# Patient Record
Sex: MALE | Race: WHITE | Employment: OTHER
[De-identification: names, ages, dates, MRNs, and addresses within clinical notes are randomized per-mention and may not be internally consistent; named-entity substitution may affect disease eponyms.]

---

## 2017-03-02 ENCOUNTER — OFFICE VISIT (OUTPATIENT)
Dept: INTERNAL MEDICINE | Facility: CLINIC | Age: 62
End: 2017-03-02

## 2017-03-02 VITALS
DIASTOLIC BLOOD PRESSURE: 78 MMHG | BODY MASS INDEX: 30.91 KG/M2 | WEIGHT: 220.8 LBS | HEART RATE: 64 BPM | HEIGHT: 71 IN | SYSTOLIC BLOOD PRESSURE: 168 MMHG | RESPIRATION RATE: 16 BRPM

## 2017-03-02 DIAGNOSIS — L50.9 HIVES: Primary | ICD-10-CM

## 2017-03-02 PROCEDURE — 99213 OFFICE O/P EST LOW 20 MIN: CPT | Performed by: INTERNAL MEDICINE

## 2017-03-02 RX ORDER — OMEPRAZOLE 20 MG/1
20 CAPSULE, DELAYED RELEASE ORAL DAILY
COMMUNITY

## 2017-03-02 ASSESSMENT — ENCOUNTER SYMPTOMS
URTICARIA: 1
WHEEZING: 0
COUGH: 0
EYE PAIN: 0
SHORTNESS OF BREATH: 0
VOMITING: 0

## 2017-03-02 ASSESSMENT — PATIENT HEALTH QUESTIONNAIRE - PHQ9
SUM OF ALL RESPONSES TO PHQ QUESTIONS 1-9: 0
SUM OF ALL RESPONSES TO PHQ9 QUESTIONS 1 & 2: 0
2. FEELING DOWN, DEPRESSED OR HOPELESS: 0
1. LITTLE INTEREST OR PLEASURE IN DOING THINGS: 0

## 2017-04-18 ENCOUNTER — HOSPITAL ENCOUNTER (OUTPATIENT)
Age: 62
Discharge: HOME | End: 2017-04-18
Payer: COMMERCIAL

## 2017-04-18 LAB
ABSOLUTE EOS #: 0 K/UL (ref 0–0.4)
ABSOLUTE LYMPH #: 2.2 K/UL (ref 1–4.8)
ABSOLUTE MONO #: 0.7 K/UL (ref 0.1–1.2)
BASOPHILS # BLD: 0 % (ref 0–2)
BASOPHILS ABSOLUTE: 0 K/UL (ref 0–0.2)
C-REACTIVE PROTEIN: 1 MG/L (ref 0–5)
DIFFERENTIAL TYPE: ABNORMAL
EOSINOPHILS RELATIVE PERCENT: 0 % (ref 1–4)
LYMPHOCYTES # BLD: 37 % (ref 24–44)
MONOCYTES # BLD: 12 % (ref 2–11)
PLATELET ESTIMATE: ABNORMAL
RBC # BLD: ABNORMAL 10*6/UL
SEDIMENTATION RATE, ERYTHROCYTE: 1 MM (ref 0–10)
SEG NEUTROPHILS: 51 % (ref 36–66)
SEGMENTED NEUTROPHILS ABSOLUTE COUNT: 3 K/UL (ref 1.8–7.7)
WBC # BLD: 5.9 K/UL (ref 3.5–11)
WBC # BLD: ABNORMAL 10*3/UL

## 2017-04-18 PROCEDURE — 85651 RBC SED RATE NONAUTOMATED: CPT

## 2017-04-18 PROCEDURE — 84165 PROTEIN E-PHORESIS SERUM: CPT

## 2017-04-18 PROCEDURE — 86038 ANTINUCLEAR ANTIBODIES: CPT

## 2017-04-18 PROCEDURE — 85048 AUTOMATED LEUKOCYTE COUNT: CPT

## 2017-04-18 PROCEDURE — 84155 ASSAY OF PROTEIN SERUM: CPT

## 2017-04-18 PROCEDURE — 36415 COLL VENOUS BLD VENIPUNCTURE: CPT

## 2017-04-18 PROCEDURE — 86140 C-REACTIVE PROTEIN: CPT

## 2017-04-19 LAB — ANTI-NUCLEAR ANTIBODY (ANA): NEGATIVE

## 2017-04-20 LAB
ALBUMIN (CALCULATED): 4.8 G/DL (ref 3.2–5.2)
ALBUMIN PERCENT: 71 % (ref 45–65)
ALPHA 1 PERCENT: 2 % (ref 3–6)
ALPHA 2 PERCENT: 7 % (ref 6–13)
ALPHA-1-GLOBULIN: 0.1 G/DL (ref 0.1–0.4)
ALPHA-2-GLOBULIN: 0.5 G/DL (ref 0.5–0.9)
BETA GLOBULIN: 0.5 G/DL (ref 0.5–1.1)
BETA PERCENT: 8 % (ref 11–19)
GAMMA GLOBULIN %: 12 % (ref 9–20)
GAMMA GLOBULIN: 0.8 G/DL (ref 0.5–1.5)
PATHOLOGIST: ABNORMAL
PROTEIN ELECTROPHORESIS, SERUM: ABNORMAL
TOTAL PROT. SUM,%: 100 % (ref 98–102)
TOTAL PROT. SUM: 6.7 G/DL (ref 6.3–8.2)
TOTAL PROTEIN: 6.7 G/DL (ref 6.4–8.3)

## 2017-09-07 ENCOUNTER — OFFICE VISIT (OUTPATIENT)
Dept: PRIMARY CARE CLINIC | Age: 62
End: 2017-09-07
Payer: COMMERCIAL

## 2017-09-07 VITALS
BODY MASS INDEX: 30.52 KG/M2 | SYSTOLIC BLOOD PRESSURE: 140 MMHG | WEIGHT: 218 LBS | HEIGHT: 71 IN | OXYGEN SATURATION: 99 % | DIASTOLIC BLOOD PRESSURE: 80 MMHG | HEART RATE: 58 BPM

## 2017-09-07 DIAGNOSIS — Z12.11 SCREEN FOR COLON CANCER: ICD-10-CM

## 2017-09-07 DIAGNOSIS — R03.0 ELEVATED BP WITHOUT DIAGNOSIS OF HYPERTENSION: ICD-10-CM

## 2017-09-07 DIAGNOSIS — Z00.00 ENCOUNTER FOR GENERAL ADULT MEDICAL EXAMINATION W/O ABNORMAL FINDINGS: Primary | ICD-10-CM

## 2017-09-07 PROBLEM — I10 ESSENTIAL HYPERTENSION: Status: ACTIVE | Noted: 2017-09-07

## 2017-09-07 PROBLEM — I10 ESSENTIAL HYPERTENSION: Status: RESOLVED | Noted: 2017-09-07 | Resolved: 2017-09-07

## 2017-09-07 PROCEDURE — 99396 PREV VISIT EST AGE 40-64: CPT | Performed by: NURSE PRACTITIONER

## 2017-09-07 RX ORDER — CETIRIZINE HYDROCHLORIDE 5 MG/1
5 TABLET ORAL DAILY
COMMUNITY

## 2017-09-07 ASSESSMENT — ENCOUNTER SYMPTOMS
SHORTNESS OF BREATH: 0
COUGH: 0
BACK PAIN: 0
ABDOMINAL PAIN: 0

## 2017-09-21 ENCOUNTER — TELEPHONE (OUTPATIENT)
Dept: PRIMARY CARE CLINIC | Age: 62
End: 2017-09-21

## 2017-09-21 ENCOUNTER — HOSPITAL ENCOUNTER (OUTPATIENT)
Age: 62
Discharge: HOME | End: 2017-09-21
Payer: COMMERCIAL

## 2017-09-21 DIAGNOSIS — Z00.00 ENCOUNTER FOR GENERAL ADULT MEDICAL EXAMINATION W/O ABNORMAL FINDINGS: ICD-10-CM

## 2017-09-21 LAB
ALBUMIN SERPL-MCNC: 4.2 G/DL (ref 3.5–5.2)
ALBUMIN/GLOBULIN RATIO: 1.6 (ref 1–2.5)
ALP BLD-CCNC: 54 U/L (ref 40–129)
ALT SERPL-CCNC: 19 U/L (ref 5–41)
ANION GAP SERPL CALCULATED.3IONS-SCNC: 15 MMOL/L (ref 9–17)
AST SERPL-CCNC: 21 U/L
BILIRUB SERPL-MCNC: 0.89 MG/DL (ref 0.3–1.2)
BUN BLDV-MCNC: 26 MG/DL (ref 8–23)
BUN/CREAT BLD: ABNORMAL (ref 9–20)
CALCIUM SERPL-MCNC: 8.9 MG/DL (ref 8.6–10.4)
CHLORIDE BLD-SCNC: 102 MMOL/L (ref 98–107)
CHOLESTEROL/HDL RATIO: 2.8
CHOLESTEROL: 159 MG/DL
CO2: 26 MMOL/L (ref 20–31)
CREAT SERPL-MCNC: 0.97 MG/DL (ref 0.7–1.2)
GFR AFRICAN AMERICAN: >60 ML/MIN
GFR NON-AFRICAN AMERICAN: >60 ML/MIN
GFR SERPL CREATININE-BSD FRML MDRD: ABNORMAL ML/MIN/{1.73_M2}
GFR SERPL CREATININE-BSD FRML MDRD: ABNORMAL ML/MIN/{1.73_M2}
GLUCOSE BLD-MCNC: 137 MG/DL (ref 70–99)
HCT VFR BLD CALC: 48.5 % (ref 41–53)
HDLC SERPL-MCNC: 57 MG/DL
HEMOGLOBIN: 16.1 G/DL (ref 13.5–17.5)
LDL CHOLESTEROL: 87 MG/DL (ref 0–130)
MCH RBC QN AUTO: 30.8 PG (ref 26–34)
MCHC RBC AUTO-ENTMCNC: 33.3 G/DL (ref 31–37)
MCV RBC AUTO: 92.7 FL (ref 80–100)
PDW BLD-RTO: 14.9 % (ref 12.5–15.4)
PLATELET # BLD: 247 K/UL (ref 140–450)
PMV BLD AUTO: 8.9 FL (ref 6–12)
POTASSIUM SERPL-SCNC: 3.9 MMOL/L (ref 3.7–5.3)
PROSTATE SPECIFIC ANTIGEN: 0.54 UG/L
RBC # BLD: 5.23 M/UL (ref 4.5–5.9)
SODIUM BLD-SCNC: 143 MMOL/L (ref 135–144)
TOTAL PROTEIN: 6.8 G/DL (ref 6.4–8.3)
TRIGL SERPL-MCNC: 77 MG/DL
VLDLC SERPL CALC-MCNC: NORMAL MG/DL (ref 1–30)
WBC # BLD: 5.8 K/UL (ref 3.5–11)

## 2017-09-21 PROCEDURE — 85027 COMPLETE CBC AUTOMATED: CPT

## 2017-09-21 PROCEDURE — 80053 COMPREHEN METABOLIC PANEL: CPT

## 2017-09-21 PROCEDURE — G0103 PSA SCREENING: HCPCS

## 2017-09-21 PROCEDURE — 36415 COLL VENOUS BLD VENIPUNCTURE: CPT

## 2017-09-21 PROCEDURE — 80061 LIPID PANEL: CPT

## 2017-10-06 ENCOUNTER — ANESTHESIA EVENT (OUTPATIENT)
Dept: OPERATING ROOM | Facility: CLINIC | Age: 62
End: 2017-10-06
Payer: COMMERCIAL

## 2017-10-06 ENCOUNTER — HOSPITAL ENCOUNTER (OUTPATIENT)
Facility: CLINIC | Age: 62
Setting detail: OUTPATIENT SURGERY
Discharge: HOME | End: 2017-10-06
Attending: SURGERY | Admitting: SURGERY
Payer: COMMERCIAL

## 2017-10-06 ENCOUNTER — ANESTHESIA (OUTPATIENT)
Dept: OPERATING ROOM | Facility: CLINIC | Age: 62
End: 2017-10-06
Payer: COMMERCIAL

## 2017-10-06 VITALS
DIASTOLIC BLOOD PRESSURE: 84 MMHG | RESPIRATION RATE: 16 BRPM | HEART RATE: 64 BPM | BODY MASS INDEX: 30.1 KG/M2 | TEMPERATURE: 97.2 F | OXYGEN SATURATION: 94 % | WEIGHT: 215 LBS | SYSTOLIC BLOOD PRESSURE: 137 MMHG | HEIGHT: 71 IN

## 2017-10-06 VITALS
SYSTOLIC BLOOD PRESSURE: 126 MMHG | RESPIRATION RATE: 13 BRPM | DIASTOLIC BLOOD PRESSURE: 80 MMHG | OXYGEN SATURATION: 99 %

## 2017-10-06 PROBLEM — Z12.11 ENCOUNTER FOR SCREENING COLONOSCOPY: Status: ACTIVE | Noted: 2017-10-06

## 2017-10-06 PROCEDURE — 2500000003 HC RX 250 WO HCPCS: Performed by: NURSE ANESTHETIST, CERTIFIED REGISTERED

## 2017-10-06 PROCEDURE — 3609027000 HC COLONOSCOPY: Performed by: SURGERY

## 2017-10-06 PROCEDURE — 7100000011 HC PHASE II RECOVERY - ADDTL 15 MIN: Performed by: SURGERY

## 2017-10-06 PROCEDURE — 2580000003 HC RX 258: Performed by: ANESTHESIOLOGY

## 2017-10-06 PROCEDURE — 7100000000 HC PACU RECOVERY - FIRST 15 MIN: Performed by: SURGERY

## 2017-10-06 PROCEDURE — 6360000002 HC RX W HCPCS: Performed by: ANESTHESIOLOGY

## 2017-10-06 PROCEDURE — 7100000010 HC PHASE II RECOVERY - FIRST 15 MIN: Performed by: SURGERY

## 2017-10-06 PROCEDURE — 3700000000 HC ANESTHESIA ATTENDED CARE: Performed by: SURGERY

## 2017-10-06 PROCEDURE — 6360000002 HC RX W HCPCS: Performed by: NURSE ANESTHETIST, CERTIFIED REGISTERED

## 2017-10-06 RX ORDER — DIPHENHYDRAMINE HYDROCHLORIDE 50 MG/ML
12.5 INJECTION INTRAMUSCULAR; INTRAVENOUS
Status: DISCONTINUED | OUTPATIENT
Start: 2017-10-06 | End: 2017-10-06 | Stop reason: HOSPADM

## 2017-10-06 RX ORDER — LABETALOL HYDROCHLORIDE 5 MG/ML
5 INJECTION, SOLUTION INTRAVENOUS EVERY 10 MIN PRN
Status: DISCONTINUED | OUTPATIENT
Start: 2017-10-06 | End: 2017-10-06 | Stop reason: HOSPADM

## 2017-10-06 RX ORDER — OXYCODONE HYDROCHLORIDE AND ACETAMINOPHEN 5; 325 MG/1; MG/1
1 TABLET ORAL PRN
Status: DISCONTINUED | OUTPATIENT
Start: 2017-10-06 | End: 2017-10-06 | Stop reason: HOSPADM

## 2017-10-06 RX ORDER — FENTANYL CITRATE 50 UG/ML
25 INJECTION, SOLUTION INTRAMUSCULAR; INTRAVENOUS EVERY 5 MIN PRN
Status: DISCONTINUED | OUTPATIENT
Start: 2017-10-06 | End: 2017-10-06 | Stop reason: HOSPADM

## 2017-10-06 RX ORDER — SODIUM CHLORIDE, SODIUM LACTATE, POTASSIUM CHLORIDE, CALCIUM CHLORIDE 600; 310; 30; 20 MG/100ML; MG/100ML; MG/100ML; MG/100ML
INJECTION, SOLUTION INTRAVENOUS CONTINUOUS
Status: DISCONTINUED | OUTPATIENT
Start: 2017-10-06 | End: 2017-10-06 | Stop reason: HOSPADM

## 2017-10-06 RX ORDER — MIDAZOLAM HYDROCHLORIDE 1 MG/ML
1 INJECTION INTRAMUSCULAR; INTRAVENOUS ONCE
Status: COMPLETED | OUTPATIENT
Start: 2017-10-06 | End: 2017-10-06

## 2017-10-06 RX ORDER — PROPOFOL 10 MG/ML
INJECTION, EMULSION INTRAVENOUS PRN
Status: DISCONTINUED | OUTPATIENT
Start: 2017-10-06 | End: 2017-10-06 | Stop reason: SDUPTHER

## 2017-10-06 RX ORDER — LIDOCAINE HYDROCHLORIDE 10 MG/ML
INJECTION, SOLUTION INFILTRATION; PERINEURAL PRN
Status: DISCONTINUED | OUTPATIENT
Start: 2017-10-06 | End: 2017-10-06 | Stop reason: SDUPTHER

## 2017-10-06 RX ORDER — ONDANSETRON 2 MG/ML
4 INJECTION INTRAMUSCULAR; INTRAVENOUS
Status: DISCONTINUED | OUTPATIENT
Start: 2017-10-06 | End: 2017-10-06 | Stop reason: HOSPADM

## 2017-10-06 RX ORDER — MORPHINE SULFATE 2 MG/ML
2 INJECTION, SOLUTION INTRAMUSCULAR; INTRAVENOUS EVERY 5 MIN PRN
Status: DISCONTINUED | OUTPATIENT
Start: 2017-10-06 | End: 2017-10-06 | Stop reason: HOSPADM

## 2017-10-06 RX ORDER — OXYCODONE HYDROCHLORIDE AND ACETAMINOPHEN 5; 325 MG/1; MG/1
2 TABLET ORAL PRN
Status: DISCONTINUED | OUTPATIENT
Start: 2017-10-06 | End: 2017-10-06 | Stop reason: HOSPADM

## 2017-10-06 RX ADMIN — MIDAZOLAM HYDROCHLORIDE 1 MG: 1 INJECTION, SOLUTION INTRAMUSCULAR; INTRAVENOUS at 10:01

## 2017-10-06 RX ADMIN — LIDOCAINE HYDROCHLORIDE 25 MG: 10 INJECTION, SOLUTION INFILTRATION; PERINEURAL at 10:08

## 2017-10-06 RX ADMIN — PROPOFOL 20 MG: 10 INJECTION, EMULSION INTRAVENOUS at 10:10

## 2017-10-06 RX ADMIN — SODIUM CHLORIDE, POTASSIUM CHLORIDE, SODIUM LACTATE AND CALCIUM CHLORIDE: 600; 310; 30; 20 INJECTION, SOLUTION INTRAVENOUS at 10:02

## 2017-10-06 RX ADMIN — PROPOFOL 60 MG: 10 INJECTION, EMULSION INTRAVENOUS at 10:08

## 2017-10-06 ASSESSMENT — PAIN - FUNCTIONAL ASSESSMENT: PAIN_FUNCTIONAL_ASSESSMENT: 0-10

## 2017-10-06 NOTE — OP NOTE
6 Sandra Beyer    10/6/2017    Gwendolyn Gamboa  1955      Pre op Diag: screening    Procedure: colonoscopy     Postop Diag: normal exam    Anesthesia MAC    The patient was placed on his left side. A rectal exam was done that showed no masses. The scope was passed under direct vision through the rectum and sigmoid colon. There were diverticula in the sigmoid colon. The prep was good. The scope was then passed across the transverse colon, down the right colon to the cecum. The scope was then slowly withdrawn checking all the walls of the bowel a second time. There were no abnormalities found. The scope was flexed in the rectum. There were no significant hemorrhoids. The patient tolerated the procedure well. I recommend a repeat colonoscopy in 10 years. Escobar Dumont MD.    Copy to Jorge Luis Harris CNP

## 2017-10-06 NOTE — H&P
REASON FOR procedure:  screening    REQUESTING PHYSICIAN:  Brenda Cruz MD    HISTORY OF PRESENT ILLNESS:    The patient is a 62 y.o. male who presents for colonoscopy    MEDICAL HISTORY:   Past Medical History:   Diagnosis Date    Hypertension     Irritable bowel syndrome     Prostate finding        SURGICAL HISTORY:  No past surgical history on file. MEDS:  Prior to Admission medications    Medication Sig Start Date End Date Taking? Authorizing Provider   cetirizine (ZYRTEC) 5 MG tablet Take 5 mg by mouth daily    Historical Provider, MD   omeprazole (PRILOSEC) 20 MG delayed release capsule Take 20 mg by mouth daily    Historical Provider, MD   Misc Natural Products (GLUCOSAMINE CHOND COMPLEX/MSM) TABS Take 1 tablet by mouth daily    Historical Provider, MD   Multiple Vitamins-Minerals (THERAPEUTIC MULTIVITAMIN-MINERALS) tablet Take 1 tablet by mouth daily    Historical Provider, MD     PRN Meds:    ALLERGIES:  No Known Allergies    SOCIAL HISTORY:    reports that he has never smoked. He has never used smokeless tobacco. He reports that he drinks alcohol. He reports that he does not use illicit drugs. FAMILY HISTORY:   No family history on file. REVIEW OF SYSTEMS:    HENT: normal  Cardiac: normal  Resp: normal  GI: normal  Ortho: normal    PHYSICAL EXAM:    There were no vitals taken for this visit. General:  Alert and oriented x 3. No acute distress. Chest: Lungs clear to auscultation, with no rales, wheezes, or rhonchi. Unlabored breathing pattern. Cardiovascular: Heart sounds were normal with a regular rate and rhythm. There were no murmurs or gallops, PMI nondisplaced. Abdomen: Bowel sounds were normal.  The abdomen was soft and non distended. There was no tenderness, guarding, rebound, or rigidity. There was no masses, hepatosplenomegaly, or hernias. No peritoneal signs. Skin/Musculoskeltal/Ext:  No jaundice. No clubbing, cyanosis or edema. ROM intact.   No

## 2017-10-06 NOTE — ANESTHESIA POSTPROCEDURE EVALUATION
Department of Anesthesiology  Postprocedure Note    Patient: Leonard Donaldson  MRN: [id number]  YOB: 1955  Date of evaluation: 10/6/2017  Time:  11:00 AM     Procedure Summary     Date Anesthesia Start Anesthesia Stop Room / Location    10/06/17 1002 1021 [facility] / [facility]       Procedure Diagnosis Surgeon Responsible Provider    COLONOSCOPY (N/A ) (SCREENING) Sherwood Dike, MD Marylene Sartorius, MD          Anesthesia Type: MAC    Mitzy Phase I: Mitzy Score: 7    Mitzy Phase II:      Last vitals:  /84 (10/06/17 1040)    Temp 97.2 °F (36.2 °C) (10/06/17 1020)    Pulse 64 (10/06/17 1040)   Resp 16 (10/06/17 1040)    SpO2 94 % (10/06/17 1040)      Anesthesia Post Evaluation    Patient location during evaluation: PACU  Patient participation: complete - patient participated  Level of consciousness: awake and alert  Pain score: 0  Nausea & Vomiting: no nausea  Cardiovascular status: hemodynamically stable  Respiratory status: room air  Hydration status: euvolemic

## 2017-10-06 NOTE — ANESTHESIA PRE PROCEDURE
[id number], BEART, [id number]     Type & Screen (If Applicable):  No results found for: LABABO, LABRH    Anesthesia Evaluation   no history of anesthetic complications:   Airway: Mallampati: II     Neck ROM: full   Dental:          Pulmonary:   (+) recent URI: resolved,         Cardiovascular:    (+) hypertension:,          ROS comment: -cp,syn,pnd,palp        Neuro/Psych:      (-) seizures  GI/Hepatic/Renal:   (+) GERD: well controlled,          Comments: IBS   Endo/Other:          Abdominal:                    Anesthesia Plan    ASA 2     MAC   (Asa 2 hbp)  intravenous induction             Nahum Berg MD   10/6/2017

## 2017-10-06 NOTE — IP AVS SNAPSHOT
After Visit Summary  (Discharge Instructions)    Medication List for Home    Based on the information you provided to us as well as any changes during this visit, the following is your updated medication list.  Compare this with your prescription bottles at home. If you have any questions or concerns, contact your primary care physician's office. Daily Medication List (This medication list can be shared with any healthcare provider who is helping you manage your medications)      ASK your doctor about these medications if you have questions        Last Dose    Next Dose Due AM NOON PM NIGHT    cetirizine 5 MG tablet   Commonly known as:  ZYRTEC   Take 5 mg by mouth daily                                         GLUCOSAMINE CHOND COMPLEX/MSM Tabs   Take 1 tablet by mouth daily                                         omeprazole 20 MG delayed release capsule   Commonly known as:  PRILOSEC   Take 20 mg by mouth daily                                         therapeutic multivitamin-minerals tablet   Take 1 tablet by mouth daily                                                 Allergies as of 10/6/2017     No Known Allergies      Immunizations as of 10/6/2017     Name Date Dose VIS Date Route    Influenza Virus Vaccine 9/14/2015 0.5 mL 8/7/2015 Intramuscular    Influenza, Quadv, 3 Years and older, IM 10/1/2016 -- -- --    External: Patient reported    Td 7/5/2016 0.5 mL 2/24/2015 Intramuscular      Last Vitals          Most Recent Value    Temp  98.1 °F (36.7 °C)    Pulse  71    Resp  16    BP  (!)  184/103         After Visit Summary    This summary was created for you. Thank you for entrusting your care to us.   The following information includes details about your hospital/visit stay along with steps you should take to help with your recovery once you leave the hospital.  In this packet, you will find information about the topics listed below: Hepatitis C screening is recommended for all adults regardless of risk factors born between [facility] at least once (lifetime) who have never been tested. 1955    HIV screening is recommended for all people regardless of risk factors  aged 15-65 years at least once (lifetime) who have never been HIV tested. 11/25/1971    Diabetes Screening 7/10/2017    Colonoscopy 8/20/2017    Yearly Flu Vaccine (1) 9/1/2017    Zoster Vaccine 3/2/2018 (Originally 11/25/2016)    Tetanus Combination Vaccine (1 - Tdap) 7/5/2026 (Originally 7/6/2016)    Cholesterol Screening 9/21/2022                 Care Plan Once You Return Home    This section includes instructions you will need to follow once you leave the hospital.  Your care team will discuss these with you, so you and those caring for you know how to best care for your health needs at home. This section may also include educational information about certain health topics that may be of help to you. Discharge Instructions       Normal changes you may experience after a colonoscopy:  · Passing of gas for several hours after  · Some mild abdominal cramping  · If a biopsy/ polypectomy was done, you may see some spotting of blood on the tissue when wiping  · You may feel fatigued for the next 24-48 hours due to the preparation, sedation and procedure    Activity   You have had anesthesia today  Do not drive, operate heavy equipment, consume alcoholic beverages, or make any important decisions  for 24 hours   Take your time changing positions today. You may feel light headed or dizzy if you move too quickly. Rest for the next 24 hours. Diet   You can eat your normal diet when you feel well. You should start off with bland foods like chicken soup, toast, or yogurt. Then advance as tolerated. Drink plenty of fluids (unless your doctor tells you not to). Your urine should be very lightly colored without a strong odor.     Medicines Continue your home medications as ordered by your physician. Call your doctor now or seek immediate medical care if:   Dr. Radha Dumont [phone]  · You are passing blood rectally or vomiting blood (color of blood may be red or black)  · Severe abdominal pain or tenderness (that is not relieved by passing air)   You have a fever, chills or excessive sweating   You have persistent nausea or vomiting   Redness or swelling at the IV site          MyChart Signup     Our records indicate that you have an active MyChart account. You can view your After Visit Summary by going to [email or web address] and logging in with your [facility] username and password. If you don't have a [facility] username and password but a parent or guardian has access to your record, the parent or guardian should login with their own [facility] username and password and access your record to view the After Visit Summary. Additional Information  If you have questions, please contact the physician practice where you receive care. Remember, [facility] is NOT to be used for urgent needs. For medical emergencies, dial 911. For questions regarding your [facility] account call [phone]. If you have a clinical question, please call your doctor's office. View your information online  ? Review your current list of  medications, immunization, and allergies. ? Review your future test results online . ? Review your discharge instructions provided by your caregivers at discharge    Certain functionality such as prescription refills, scheduling appointments or sending messages to your provider are not activated if your provider does not use [facility] in his/her office    For questions regarding your MyChart account call [phone]. If you have a clinical question, please call your doctor's office.          The information on all pages of the After Visit Summary has been reviewed with me, the patient and/or responsible adult, by my health care provider(s). I had the opportunity to ask questions regarding this information. I understand I should dispose of my armband safely at home to protect my health information. A complete copy of the After Visit Summary has been given to me, the patient and/or responsible adult.            Patient Signature/Responsible Adult:____________________    Clinician Signature:_____________________    Date:_____________________    Time:_____________________

## 2018-04-12 PROBLEM — Z12.11 ENCOUNTER FOR SCREENING COLONOSCOPY: Status: RESOLVED | Noted: 2017-10-06 | Resolved: 2018-04-12

## 2021-11-23 ENCOUNTER — HOSPITAL ENCOUNTER (OUTPATIENT)
Age: 66
Discharge: HOME | End: 2021-11-23
Payer: MEDICARE

## 2021-11-23 LAB — PROSTATE SPECIFIC ANTIGEN: 0.47 UG/L

## 2021-11-23 PROCEDURE — 36415 COLL VENOUS BLD VENIPUNCTURE: CPT

## 2021-11-23 PROCEDURE — 84153 ASSAY OF PSA TOTAL: CPT

## 2024-04-17 ENCOUNTER — HOSPITAL ENCOUNTER (OUTPATIENT)
Age: 69
Discharge: HOME | End: 2024-04-17
Payer: MEDICARE

## 2024-04-17 PROCEDURE — 93005 ELECTROCARDIOGRAM TRACING: CPT | Performed by: UROLOGY

## 2024-04-19 LAB
EKG ATRIAL RATE: 64 BPM
EKG P AXIS: 62 DEGREES
EKG P-R INTERVAL: 162 MS
EKG Q-T INTERVAL: 418 MS
EKG QRS DURATION: 120 MS
EKG QTC CALCULATION (BAZETT): 431 MS
EKG R AXIS: 18 DEGREES
EKG T AXIS: 29 DEGREES
EKG VENTRICULAR RATE: 64 BPM

## 2024-06-13 LAB — URINE CULTURE, ROUTINE: NORMAL STATUS

## 2025-02-21 ENCOUNTER — HOSPITAL ENCOUNTER (OUTPATIENT)
Dept: PHYSICAL THERAPY | Facility: CLINIC | Age: 70
Setting detail: THERAPIES SERIES
Discharge: HOME | End: 2025-02-21
Payer: MEDICARE

## 2025-02-21 PROCEDURE — 97110 THERAPEUTIC EXERCISES: CPT

## 2025-02-21 PROCEDURE — 97530 THERAPEUTIC ACTIVITIES: CPT

## 2025-02-21 PROCEDURE — 97161 PT EVAL LOW COMPLEX 20 MIN: CPT

## 2025-02-21 NOTE — FLOWSHEET NOTE
[facility] Fall Risk Assessment    Patient Name:  Garcia Lewis  : 1955    Risk Factor Scale  Score   History of Falls [] Yes  [x] No 25  0 0   Secondary Diagnosis [] Yes  [x] No 15  0 0   Ambulatory Aid [] Furniture  [] Crutches/cane/walker  [x] None/bedrest/wheelchair/nurse 30  15  0 0   IV/Heparin Lock [] Yes  [x] No 20  0 0   Gait/Transferring [] Impaired  [] Weak  [x] Normal/bedrest/immobile 20  10  0 0   Mental Status [] Forgets limitations  [x] Oriented to own ability 15  0 0      Total:0     Based on the Assessment score: check the appropriate box.    [x]  No intervention needed   Low =   Score of 0-24    []  Use standard prevention interventions Moderate =  Score of 24-44   [] Give patient handout and discuss fall prevention strategies   [] Establish goal of education for patient/family RE: fall prevention strategies    []  Use high risk prevention interventions High = Score of 45 and higher   [] Give patient handout and discuss fall prevention strategies   [] Establish goal of education for patient/family Re: fall prevention strategies   [] Discuss lifeline / other resources    Electronically signed by:   Naomi Galeano, PT  Date: 2025

## 2025-02-21 NOTE — CONSULTS
[x] [facility]  Outpatient Rehabilitation &  Therapy  [address]  P: [phone]  F: [phone] [] [facility]  Outpatient Rehabilitation &  Therapy  [address]   [address]  P: [phone]  F: [phone]     Physical Therapy Pelvic Floor Evaluation    Date:  2025  Patient: Garcia Lewis  : 1955  MRN: [id number]  Physician: Uri Garcia     Ins[place] [employer] [place]  Medical Diagnosis: Benign prostatic hyperplasia with lower urinary tract symptoms N40.1    Rehab Codes: R35.0, R35.15, M62.50, N39.3  Onset Date: 25                                  Next 's appt: PRN    Subjective:   CC:Pt is a 68 yo male with hx of voiding dysfunction. He had a laser vaporization of prostate in May 2024 due to BPH. He does not c/o of any obstructive symptoms but has significant complaints of urgency and frequency. Does c/o occasional THIAGO. States he is getting the urge about every 1-2 hours during the day and sometimes more. Night time 1-2 voids. Bothered more by cold vs heat.Stes he does have IBS but does not feel he has any constipation complaints. Drinks mostly water throughout the day. Cysto was negative. Trial of meds without success.    HPI: (onset date:25)     PMHx:               [x] Refer to full medical chart  In EPIC   Past Medical History:   Diagnosis Date    Hypertension     Irritable bowel syndrome     Prostate finding    Comorbidities:   [] Obesity [] Dialysis  [x] NA   [] Asthma/COPD [] Dementia [] Other:   [] Stroke [] Sleep apnea [] Other:   [] Vascular disease [] Rheumatic disease [] Other:      Tests:   [] X-Ray:        [] MRI:                      [x] Other:Cysto (neg)     Medications: [x] Refer to full medical record          [] None          [] Other:  Allergies:       [x] Refer to full medical record  [] None          [] Other:     Function:  Hand Dominance  [x] Right  [] Left  Employer    Job Status []

## 2025-02-25 ENCOUNTER — HOSPITAL ENCOUNTER (OUTPATIENT)
Dept: PHYSICAL THERAPY | Facility: CLINIC | Age: 70
Setting detail: THERAPIES SERIES
Discharge: HOME | End: 2025-02-25
Payer: MEDICARE

## 2025-02-25 PROCEDURE — 97110 THERAPEUTIC EXERCISES: CPT

## 2025-02-25 PROCEDURE — 97032 APPL MODALITY 1+ESTIM EA 15: CPT

## 2025-02-25 PROCEDURE — 90912 BFB TRAINING 1ST 15 MIN: CPT

## 2025-02-25 NOTE — FLOWSHEET NOTE
[] [facility]  Outpatient Rehabilitation &  Therapy  [address]  P: [phone]  F: [phone] [x] [facility]  Outpatient Rehabilitation &  Therapy  [address]  P: [phone]  F: [phone]     Physical Therapy Daily Treatment Note    Date:  2025  Patient Name:  Garcia Lewis    :  1955  MRN: [id number]  Physician: Uri Garcia                                Insurance[place] [employer] [place]  Medical Diagnosis: Benign prostatic hyperplasia with lower urinary tract symptoms N40.1                Rehab Codes: R35.0, R35.15, M62.50, N39.3  Onset Date: 25                                  Next 's appt: PRN    Visit# / total visits: ; Progress note for Medicare patient due at visit 8     Cancels/No Shows:     Subjective:    Pain:  [] Yes  [x] No Location: N/A Pain Rating: (0-10 scale) 0/10  Pain altered Tx:  [x] No  [] Yes  Action:  Comments:Pt states he did notice some improvement and felt great on . However, states he had a \"bad\" day yesterday. States he was been working on HEP.    Objective:  Modalities:   Precautions [x] No  [] Yes:   Exercises:  Exercise Reps/ Time Weight/ Level Comments   Male PF pic explanation [x]     Review   Urine stop test  [x]     Review   PF ex: short holds  30-40x  1-2 sec     PF ex: long holds  30-40x  10 x 5 sec     The knack ex  [x]     Review    Urge deference tech  [x]       Diaphragmatic Breathing ex 10x     Other:     Treatment Charges: Mins Units Time In/Out   [x]  Modalities: PF ES 15 1    [x]  Ther Exercise 10 1    []  Neuromuscular Re-ed      []  Gait Training      []  Manual Therapy      []  Ther Activities      []  Aquatics      []  Vasocompression      []  Cervical Traction      [x]  Other: BFB 15 1    Total Billable time 40 min        Assessment: [x] Progressing toward goals.Hooked pt up to biofeedback with internal rectal sensor self placed per the PT with

## 2025-03-07 ENCOUNTER — HOSPITAL ENCOUNTER (OUTPATIENT)
Dept: PHYSICAL THERAPY | Facility: CLINIC | Age: 70
Setting detail: THERAPIES SERIES
Discharge: HOME | End: 2025-03-07
Payer: MEDICARE

## 2025-03-07 PROCEDURE — 97110 THERAPEUTIC EXERCISES: CPT

## 2025-03-07 PROCEDURE — 90912 BFB TRAINING 1ST 15 MIN: CPT

## 2025-03-07 PROCEDURE — 97032 APPL MODALITY 1+ESTIM EA 15: CPT

## 2025-03-07 NOTE — FLOWSHEET NOTE
[] [facility]  Outpatient Rehabilitation &  Therapy  [address]  P: [phone]  F: [phone] [x] [facility]  Outpatient Rehabilitation &  Therapy  [address]  P: [phone]  F: [phone]     Physical Therapy Daily Treatment Note    Date:  3/7/2025  Patient Name:  Garcia Lewis    :  1955  MRN: [id number]  Physician: Uri Garcia                                Insurance[place] [employer] [place]  Medical Diagnosis: Benign prostatic hyperplasia with lower urinary tract symptoms N40.1                Rehab Codes: R35.0, R35.15, M62.50, N39.3  Onset Date: 25                                  Next 's appt: PRN    Visit# / total visits: 3/8; Progress note for Medicare patient due at visit 8     Cancels/No Shows:     Subjective:    Pain:  [] Yes  [x] No Location: N/A Pain Rating: (0-10 scale) 0/10  Pain altered Tx:  [x] No  [] Yes  Action:  Comments:Pt states he has good days and bad.  This morning states he has gone to the bathroom 5 times since 7:30.  Pt notes having 1 cup of coffee.  Is trying to use urge defer tech and breathing ex.    Objective:  Modalities:   Precautions [x] No  [] Yes:   Exercises:  Exercise Reps/ Time Weight/ Level Comments   Male PF pic explanation [x]     Review   Urine stop test  [x]     Review   PF ex: short holds  30-40x  1-2 sec     PF ex: long holds  30-40x  10 x 5 sec     The knack ex  [x]     Review    Urge deference tech  [x]       Diaphragmatic Breathing ex 10x     Voiding tips X     Other:     Treatment Charges: Mins Units Time In/Out   [x]  Modalities: PF ES 15 1    [x]  Ther Exercise 10 1    []  Neuromuscular Re-ed      []  Gait Training      []  Manual Therapy      []  Ther Activities      []  Aquatics      []  Vasocompression      []  Cervical Traction      [x]  Other: BFB 15 1    Total Billable time 40 min 3       Assessment: [x] Progressing toward goals.Hooked pt up to

## 2025-03-21 ENCOUNTER — HOSPITAL ENCOUNTER (OUTPATIENT)
Dept: PHYSICAL THERAPY | Facility: CLINIC | Age: 70
Setting detail: THERAPIES SERIES
Discharge: HOME | End: 2025-03-21
Payer: MEDICARE

## 2025-03-21 PROCEDURE — 97110 THERAPEUTIC EXERCISES: CPT

## 2025-03-21 PROCEDURE — 90912 BFB TRAINING 1ST 15 MIN: CPT

## 2025-03-21 PROCEDURE — 97032 APPL MODALITY 1+ESTIM EA 15: CPT

## 2025-03-21 NOTE — FLOWSHEET NOTE
[] [facility]  Outpatient Rehabilitation &  Therapy  [address]  P: [phone]  F: [phone] [x] [facility]  Outpatient Rehabilitation &  Therapy  [address]  P: [phone]  F: [phone]     Physical Therapy Daily Treatment Note    Date:  3/21/2025  Patient Name:  Garcia Lewis    :  1955  MRN: [id number]  Physician: Uri Garcia                                Insurance: [employer] [place]  Medical Diagnosis: Benign prostatic hyperplasia with lower urinary tract symptoms N40.1                Rehab Codes: R35.0, R35.15, M62.50, N39.3  Onset Date: 25                                  Next 's appt: PRN    Visit# / total visits: ; Progress note for Medicare patient due at visit 8     Cancels/No Shows:     Subjective:    Pain:  [] Yes  [x] No Location: N/A Pain Rating: (0-10 scale) 0/10  Pain altered Tx:  [x] No  [] Yes  Action:  Comments:Pt states he is no better.  Pt is doing HEP, and trying to use Urge defer tech when at home.   Pt reports having daily BM and no constipation.  Objective:  Modalities:   Precautions [x] No  [] Yes:   Exercises:  Exercise Reps/ Time Weight/ Level Comments   Male PF pic explanation [x]     Review   Urine stop test  [x]     Review   PF ex: short holds  30-40x  1-2 sec     PF ex: long holds  30-40x  10 x 5 sec     The knack ex  [x]     Review    Urge deference tech  [x]       Diaphragmatic Breathing ex 10x     Voiding tips X     Other:     Treatment Charges: Mins Units Time In/Out   [x]  Modalities: PF ES 15 1    [x]  Ther Exercise 10 1    []  Neuromuscular Re-ed      []  Gait Training      []  Manual Therapy      []  Ther Activities      []  Aquatics      []  Vasocompression      []  Cervical Traction      [x]  Other: BFB 15 1    Total Billable time 40 min 3       Assessment: [x] Progressing toward goals.Hooked pt up to biofeedback with internal rectal sensor self placed per the

## 2025-04-11 ENCOUNTER — HOSPITAL ENCOUNTER (OUTPATIENT)
Dept: PHYSICAL THERAPY | Facility: CLINIC | Age: 70
Setting detail: THERAPIES SERIES
Discharge: HOME | End: 2025-04-11
Payer: MEDICARE

## 2025-04-11 PROCEDURE — 97032 APPL MODALITY 1+ESTIM EA 15: CPT

## 2025-04-11 PROCEDURE — 97530 THERAPEUTIC ACTIVITIES: CPT

## 2025-04-11 PROCEDURE — 90912 BFB TRAINING 1ST 15 MIN: CPT

## 2025-04-11 NOTE — FLOWSHEET NOTE
improvement on CANDELARIA to indicate improved urogenital functioning.     Patient goals:Less frequent urination    Pt. Education:  [x] Yes  [] No  [x] Reviewed Prior HEP/Ed  Method of Education: [x] Verbal  [x] Demo  [] Written (Diaphragmatic breathing ex, urge deference tech)reviewed  Comprehension of Education:  [x] Verbalizes understanding.  [x] Demonstrates understanding.  [] Needs review.  [] Demonstrates/verbalizes HEP/Ed previously given.     Plan: [x] Continue current frequency toward long and short term goals.    [x] Specific Instructions for subsequent treatments: Biofeedback, PF ES, review urge defer and voiding tips and to avoid \"just in case\" voiding,  and bowel management. Progressive PF ex       Time In:10:00 am           Time Out: 1047 am    Electronically signed by:  ANAID REYES PTA

## 2025-04-22 ENCOUNTER — HOSPITAL ENCOUNTER (EMERGENCY)
Age: 70
Discharge: HOME | End: 2025-04-22
Attending: EMERGENCY MEDICINE
Payer: MEDICARE

## 2025-04-22 ENCOUNTER — APPOINTMENT (OUTPATIENT)
Dept: CT IMAGING | Age: 70
End: 2025-04-22
Payer: MEDICARE

## 2025-04-22 VITALS
SYSTOLIC BLOOD PRESSURE: 136 MMHG | RESPIRATION RATE: 16 BRPM | HEIGHT: 72 IN | OXYGEN SATURATION: 98 % | TEMPERATURE: 98.6 F | DIASTOLIC BLOOD PRESSURE: 69 MMHG | WEIGHT: 205.03 LBS | HEART RATE: 74 BPM | BODY MASS INDEX: 27.77 KG/M2

## 2025-04-22 DIAGNOSIS — K57.32 DIVERTICULITIS OF COLON: Primary | ICD-10-CM

## 2025-04-22 DIAGNOSIS — R10.13 DYSPEPSIA: ICD-10-CM

## 2025-04-22 LAB
ALBUMIN SERPL-MCNC: 4.3 G/DL (ref 3.5–5.2)
ALBUMIN/GLOB SERPL: 1.3 {RATIO} (ref 1–2.5)
ALP SERPL-CCNC: 71 U/L (ref 40–129)
ALT SERPL-CCNC: 14 U/L (ref 5–41)
ANION GAP SERPL CALCULATED.3IONS-SCNC: 15 MMOL/L (ref 9–17)
AST SERPL-CCNC: 22 U/L
BACTERIA URNS QL MICRO: ABNORMAL
BASOPHILS # BLD: 0 K/UL (ref 0–0.2)
BASOPHILS NFR BLD: 0 % (ref 0–2)
BILIRUB SERPL-MCNC: 1.2 MG/DL (ref 0.3–1.2)
BILIRUB UR QL STRIP: NEGATIVE
BUN SERPL-MCNC: 20 MG/DL (ref 8–23)
CALCIUM SERPL-MCNC: 9.1 MG/DL (ref 8.6–10.4)
CHARACTER UR: ABNORMAL
CHLORIDE SERPL-SCNC: 104 MMOL/L (ref 98–107)
CLARITY UR: CLEAR
CO2 SERPL-SCNC: 22 MMOL/L (ref 20–31)
COLOR UR: YELLOW
CREAT SERPL-MCNC: 0.9 MG/DL (ref 0.7–1.2)
DATE, STOOL #1: NORMAL
EOSINOPHIL # BLD: 0 K/UL (ref 0–0.4)
EOSINOPHILS RELATIVE PERCENT: 0 % (ref 1–4)
EPI CELLS #/AREA URNS HPF: ABNORMAL /HPF (ref 0–5)
ERYTHROCYTE [DISTWIDTH] IN BLOOD BY AUTOMATED COUNT: 13.5 % (ref 12.5–15.4)
GFR, ESTIMATED: >90 ML/MIN/1.73M2
GLUCOSE SERPL-MCNC: 160 MG/DL (ref 70–99)
GLUCOSE UR STRIP-MCNC: NEGATIVE MG/DL
HCT VFR BLD AUTO: 48.2 % (ref 41–53)
HEMOCCULT SP1 STL QL: NEGATIVE
HGB BLD-MCNC: 16.1 G/DL (ref 13.5–17.5)
HGB UR QL STRIP.AUTO: NEGATIVE
KETONES UR STRIP-MCNC: ABNORMAL MG/DL
LACTATE BLDV-SCNC: 1.7 MMOL/L (ref 0.5–2.2)
LEUKOCYTE ESTERASE UR QL STRIP: NEGATIVE
LIPASE SERPL-CCNC: 17 U/L (ref 13–60)
LYMPHOCYTES NFR BLD: 0.7 K/UL (ref 1–4.8)
LYMPHOCYTES RELATIVE PERCENT: 9 % (ref 24–44)
MCH RBC QN AUTO: 32.1 PG (ref 26–34)
MCHC RBC AUTO-ENTMCNC: 33.4 G/DL (ref 31–37)
MCV RBC AUTO: 96 FL (ref 80–100)
MONOCYTES NFR BLD: 1.2 K/UL (ref 0.1–1.2)
MONOCYTES NFR BLD: 15 % (ref 2–11)
NEUTROPHILS NFR BLD: 76 % (ref 36–66)
NEUTS SEG NFR BLD: 6.1 K/UL (ref 1.8–7.7)
NITRITE UR QL STRIP: NEGATIVE
PH UR STRIP: 6 [PH] (ref 5–8)
PLATELET # BLD AUTO: 253 K/UL (ref 140–450)
PMV BLD AUTO: 8.2 FL (ref 6–12)
POTASSIUM SERPL-SCNC: 3.7 MMOL/L (ref 3.7–5.3)
PROT SERPL-MCNC: 7.6 G/DL (ref 6.4–8.3)
PROT UR STRIP-MCNC: ABNORMAL MG/DL
RBC # BLD AUTO: 5.03 M/UL (ref 4.5–5.9)
RBC #/AREA URNS HPF: ABNORMAL /HPF (ref 0–2)
SODIUM SERPL-SCNC: 141 MMOL/L (ref 135–144)
SP GR UR STRIP: 1.03 (ref 1–1.03)
TIME, STOOL #1: 1630
TROPONIN I SERPL HS-MCNC: 11 NG/L (ref 0–22)
UROBILINOGEN UR STRIP-ACNC: NORMAL EU/DL (ref 0–1)
WBC #/AREA URNS HPF: ABNORMAL /HPF (ref 0–5)
WBC OTHER # BLD: 8 K/UL (ref 3.5–11)

## 2025-04-22 PROCEDURE — 85025 COMPLETE CBC W/AUTO DIFF WBC: CPT

## 2025-04-22 PROCEDURE — 71275 CT ANGIOGRAPHY CHEST: CPT

## 2025-04-22 PROCEDURE — 99285 EMERGENCY DEPT VISIT HI MDM: CPT

## 2025-04-22 PROCEDURE — 87506 IADNA-DNA/RNA PROBE TQ 6-11: CPT

## 2025-04-22 PROCEDURE — 2580000003 HC RX 258: Performed by: NURSE PRACTITIONER

## 2025-04-22 PROCEDURE — 2500000003 HC RX 250 WO HCPCS: Performed by: STUDENT IN AN ORGANIZED HEALTH CARE EDUCATION/TRAINING PROGRAM

## 2025-04-22 PROCEDURE — 93005 ELECTROCARDIOGRAM TRACING: CPT | Performed by: NURSE PRACTITIONER

## 2025-04-22 PROCEDURE — 84484 ASSAY OF TROPONIN QUANT: CPT

## 2025-04-22 PROCEDURE — 83605 ASSAY OF LACTIC ACID: CPT

## 2025-04-22 PROCEDURE — 96375 TX/PRO/DX INJ NEW DRUG ADDON: CPT

## 2025-04-22 PROCEDURE — 87449 NOS EACH ORGANISM AG IA: CPT

## 2025-04-22 PROCEDURE — 96374 THER/PROPH/DIAG INJ IV PUSH: CPT

## 2025-04-22 PROCEDURE — 83690 ASSAY OF LIPASE: CPT

## 2025-04-22 PROCEDURE — 6360000004 HC RX CONTRAST MEDICATION: Performed by: STUDENT IN AN ORGANIZED HEALTH CARE EDUCATION/TRAINING PROGRAM

## 2025-04-22 PROCEDURE — 6360000002 HC RX W HCPCS: Performed by: NURSE PRACTITIONER

## 2025-04-22 PROCEDURE — 6370000000 HC RX 637 (ALT 250 FOR IP): Performed by: NURSE PRACTITIONER

## 2025-04-22 PROCEDURE — 87324 CLOSTRIDIUM AG IA: CPT

## 2025-04-22 PROCEDURE — 82270 OCCULT BLOOD FECES: CPT

## 2025-04-22 PROCEDURE — 81001 URINALYSIS AUTO W/SCOPE: CPT

## 2025-04-22 PROCEDURE — 80053 COMPREHEN METABOLIC PANEL: CPT

## 2025-04-22 RX ORDER — LIDOCAINE HYDROCHLORIDE 20 MG/ML
10 SOLUTION OROPHARYNGEAL ONCE
Status: COMPLETED | OUTPATIENT
Start: 2025-04-22 | End: 2025-04-22

## 2025-04-22 RX ORDER — MORPHINE SULFATE 4 MG/ML
4 INJECTION, SOLUTION INTRAMUSCULAR; INTRAVENOUS
Refills: 0 | Status: DISCONTINUED | OUTPATIENT
Start: 2025-04-22 | End: 2025-04-22 | Stop reason: HOSPADM

## 2025-04-22 RX ORDER — IOPAMIDOL 755 MG/ML
100 INJECTION, SOLUTION INTRAVASCULAR
Status: COMPLETED | OUTPATIENT
Start: 2025-04-22 | End: 2025-04-22

## 2025-04-22 RX ORDER — MAGNESIUM HYDROXIDE/ALUMINUM HYDROXICE/SIMETHICONE 120; 1200; 1200 MG/30ML; MG/30ML; MG/30ML
30 SUSPENSION ORAL ONCE
Status: COMPLETED | OUTPATIENT
Start: 2025-04-22 | End: 2025-04-22

## 2025-04-22 RX ORDER — OMEPRAZOLE 20 MG/1
20 CAPSULE, DELAYED RELEASE ORAL 2 TIMES DAILY
Qty: 30 CAPSULE | Refills: 0 | Status: SHIPPED | OUTPATIENT
Start: 2025-04-22

## 2025-04-22 RX ORDER — 0.9 % SODIUM CHLORIDE 0.9 %
80 INTRAVENOUS SOLUTION INTRAVENOUS ONCE
Status: DISCONTINUED | OUTPATIENT
Start: 2025-04-22 | End: 2025-04-22 | Stop reason: HOSPADM

## 2025-04-22 RX ORDER — LISINOPRIL 2.5 MG/1
TABLET ORAL DAILY
COMMUNITY
End: 2025-04-25

## 2025-04-22 RX ORDER — 0.9 % SODIUM CHLORIDE 0.9 %
1000 INTRAVENOUS SOLUTION INTRAVENOUS ONCE
Status: COMPLETED | OUTPATIENT
Start: 2025-04-22 | End: 2025-04-22

## 2025-04-22 RX ORDER — SUCRALFATE 1 G/1
1 TABLET ORAL 4 TIMES DAILY
Qty: 120 TABLET | Refills: 0 | Status: SHIPPED | OUTPATIENT
Start: 2025-04-22 | End: 2025-05-22

## 2025-04-22 RX ORDER — ONDANSETRON 2 MG/ML
4 INJECTION INTRAMUSCULAR; INTRAVENOUS ONCE
Status: COMPLETED | OUTPATIENT
Start: 2025-04-22 | End: 2025-04-22

## 2025-04-22 RX ORDER — SODIUM CHLORIDE 0.9 % (FLUSH) 0.9 %
10 SYRINGE (ML) INJECTION PRN
Status: DISCONTINUED | OUTPATIENT
Start: 2025-04-22 | End: 2025-04-22 | Stop reason: HOSPADM

## 2025-04-22 RX ADMIN — ONDANSETRON 4 MG: 2 INJECTION, SOLUTION INTRAMUSCULAR; INTRAVENOUS at 15:35

## 2025-04-22 RX ADMIN — Medication 80 ML: at 17:19

## 2025-04-22 RX ADMIN — SODIUM CHLORIDE 1000 ML: 0.9 INJECTION, SOLUTION INTRAVENOUS at 15:30

## 2025-04-22 RX ADMIN — FAMOTIDINE 20 MG: 10 INJECTION, SOLUTION INTRAVENOUS at 15:37

## 2025-04-22 RX ADMIN — SODIUM CHLORIDE, PRESERVATIVE FREE 10 ML: 5 INJECTION INTRAVENOUS at 17:19

## 2025-04-22 RX ADMIN — PANTOPRAZOLE SODIUM 40 MG: 40 INJECTION, POWDER, FOR SOLUTION INTRAVENOUS at 15:31

## 2025-04-22 RX ADMIN — IOPAMIDOL 100 ML: 755 INJECTION, SOLUTION INTRAVENOUS at 17:19

## 2025-04-22 RX ADMIN — LIDOCAINE HYDROCHLORIDE 10 ML: 20 SOLUTION ORAL at 18:22

## 2025-04-22 RX ADMIN — ALUMINUM HYDROXIDE, MAGNESIUM HYDROXIDE, AND SIMETHICONE 30 ML: 200; 200; 20 SUSPENSION ORAL at 18:22

## 2025-04-22 RX ADMIN — AMOXICILLIN AND CLAVULANATE POTASSIUM 1 TABLET: 875; 125 TABLET, FILM COATED ORAL at 19:43

## 2025-04-22 ASSESSMENT — PAIN - FUNCTIONAL ASSESSMENT: PAIN_FUNCTIONAL_ASSESSMENT: NONE - DENIES PAIN

## 2025-04-22 NOTE — ED PROVIDER NOTES
[facility] EMERGENCY DEPARTMENT  EMERGENCY DEPARTMENT ENCOUNTER      Pt Name: Garcia Lewis  MRN: [id number]  Birthdate 1955  Date of evaluation: 4/22/2025  Provider: CARRI Grant CNP  7:10 PM    CHIEF COMPLAINT       Chief Complaint   Patient presents with    Vomiting    Diarrhea     History of IBS.  Pt complains of vomiting and diarrhea for 4 days.          HISTORY OF PRESENT ILLNESS    Garcia Lewis is a 69 y.o. male who presents to the emergency department for evaluation of abdominal pain vomiting.  Patient states that for the past 4 days he has been having a lot of epigastric pain upper abdominal pain reminds him of when he had a gastric ulcer in 1981.  A lot of belching.  States that anything he eats causes extreme pain to the esophagus.  Has been on Prilosec for 30 years, occasionally drinks alcohol. No abdominal surgeries, but did have a TURP and has urinary retention issues. This does not feel similar to that. Reports diarrhea, no blood or black stools. No blood in vomit    HPI    Nursing Notes were reviewed.    REVIEW OF SYSTEMS       Review of Systems   All other systems reviewed and are negative.      Except as noted above the remainder of the review of systems was reviewed and negative.       PAST MEDICAL HISTORY     Past Medical History:   Diagnosis Date    Hypertension     Irritable bowel syndrome     Prostate finding          SURGICAL HISTORY       Past Surgical History:   Procedure Laterality Date    BACK SURGERY      COLONOSCOPY  10/06/2017    per Dr. Alvarez    ID COLON CA SCRN NOT HI RSK IND N/A 10/6/2017    COLONOSCOPY performed by Kris Alvarez MD at [facility]         CURRENT MEDICATIONS       Previous Medications    CETIRIZINE (ZYRTEC) 5 MG TABLET    Take 5 mg by mouth daily    LISINOPRIL (PRINIVIL;ZESTRIL) 2.5 MG TABLET    Take by mouth daily    MISC NATURAL PRODUCTS (GLUCOSAMINE CHOND COMPLEX/MSM) TABS    Take 1 tablet by mouth daily    MULTIPLE 
with no   aneurysm or dissection.  No significant brachiocephalic or visceral stenosis.   2. No acute pulmonary findings.   3. Findings concerning for uncomplicated diverticulitis involving the sigmoid   colon.  Mild pericolonic inflammatory changes.  See series 4, image 233.   4. No other acute findings within the abdomen or pelvis.               LABS:  Results for orders placed or performed during the hospital encounter of 04/22/25   CMP   Result Value Ref Range    Sodium 141 135 - 144 mmol/L    Potassium 3.7 3.7 - 5.3 mmol/L    Chloride 104 98 - 107 mmol/L    CO2 22 20 - 31 mmol/L    Anion Gap 15 9 - 17 mmol/L    Glucose 160 (H) 70 - 99 mg/dL    BUN 20 8 - 23 mg/dL    Creatinine 0.9 0.7 - 1.2 mg/dL    Est, Glom Filt Rate >90 >60 mL/min/1.73m2    Calcium 9.1 8.6 - 10.4 mg/dL    Total Protein 7.6 6.4 - 8.3 g/dL    Albumin 4.3 3.5 - 5.2 g/dL    Albumin/Globulin Ratio 1.3 1.0 - 2.5    Total Bilirubin 1.2 0.3 - 1.2 mg/dL    Alkaline Phosphatase 71 40 - 129 U/L    ALT 14 5 - 41 U/L    AST 22 <40 U/L   CBC with Auto Differential   Result Value Ref Range    WBC 8.0 3.5 - 11.0 k/uL    RBC 5.03 4.5 - 5.9 m/uL    Hemoglobin 16.1 13.5 - 17.5 g/dL    Hematocrit 48.2 41 - 53 %    MCV 96.0 80 - 100 fL    MCH 32.1 26 - 34 pg    MCHC 33.4 31 - 37 g/dL    RDW 13.5 12.5 - 15.4 %    Platelets 253 140 - 450 k/uL    MPV 8.2 6.0 - 12.0 fL    Neutrophils % 76 (H) 36 - 66 %    Lymphocytes % 9 (L) 24 - 44 %    Monocytes % 15 (H) 2 - 11 %    Eosinophils % 0 (L) 1 - 4 %    Basophils % 0 0 - 2 %    Neutrophils Absolute 6.10 1.8 - 7.7 k/uL    Lymphocytes Absolute 0.70 (L) 1.0 - 4.8 k/uL    Monocytes Absolute 1.20 0.1 - 1.2 k/uL    Eosinophils Absolute 0.00 0.0 - 0.4 k/uL    Basophils Absolute 0.00 0.0 - 0.2 k/uL   Lipase   Result Value Ref Range    Lipase 17 13 - 60 U/L   Lactic Acid   Result Value Ref Range    Lactic Acid 1.7 0.5 - 2.2 mmol/L   Troponin   Result Value Ref Range    Troponin, High Sensitivity 11 0 - 22 ng/L   Urinalysis with

## 2025-04-22 NOTE — DISCHARGE INSTRUCTIONS
We evaluated you today for diarrhea, abdominal pain.  Diverticulitis of the colon is noted.  Please start Augmentin and take it as prescribed until gone.  I do have suspicion that you may also have a recurrence of your ulcer and you will need to follow-up with GI to have an EGD.  For now, increase your Prilosec to twice daily.  Please do this until you can follow-up with the GI team.    Avoid alcohol, avoid fried spicy fatty greasy foods, avoid tomato-based products or citrus products.    Return to the emergency department for fevers, nausea or vomiting, worsening abdominal pain, blood in the stool, blood in vomit, or any other concerns.

## 2025-04-23 ENCOUNTER — TELEPHONE (OUTPATIENT)
Dept: GASTROENTEROLOGY | Age: 70
End: 2025-04-23

## 2025-04-23 LAB
C DIFF GDH + TOXINS A+B STL QL IA.RAPID: NEGATIVE
EKG ATRIAL RATE: 86 BPM
EKG P AXIS: 53 DEGREES
EKG P-R INTERVAL: 150 MS
EKG Q-T INTERVAL: 384 MS
EKG QRS DURATION: 96 MS
EKG QTC CALCULATION (BAZETT): 459 MS
EKG R AXIS: 13 DEGREES
EKG T AXIS: 34 DEGREES
EKG VENTRICULAR RATE: 86 BPM
SPECIMEN DESCRIPTION: NORMAL

## 2025-04-23 PROCEDURE — 93010 ELECTROCARDIOGRAM REPORT: CPT | Performed by: INTERNAL MEDICINE

## 2025-04-23 NOTE — TELEPHONE ENCOUNTER
Pt called in to follow up on ED follow up--was seen @  ED on 04/22/25 for Diverticulitis of colon. Was referred to Dr. Diop for EGD and Colonoscopy.      Pt states is not on any blood thinners        Please call pt to asst @ [phone]

## 2025-04-24 ENCOUNTER — TELEPHONE (OUTPATIENT)
Dept: GASTROENTEROLOGY | Age: 70
End: 2025-04-24

## 2025-04-24 ENCOUNTER — OFFICE VISIT (OUTPATIENT)
Dept: GASTROENTEROLOGY | Age: 70
End: 2025-04-24
Payer: MEDICARE

## 2025-04-24 VITALS
DIASTOLIC BLOOD PRESSURE: 84 MMHG | HEIGHT: 72 IN | OXYGEN SATURATION: 99 % | TEMPERATURE: 97.3 F | WEIGHT: 202 LBS | RESPIRATION RATE: 18 BRPM | HEART RATE: 75 BPM | SYSTOLIC BLOOD PRESSURE: 143 MMHG | BODY MASS INDEX: 27.36 KG/M2

## 2025-04-24 DIAGNOSIS — Z12.11 COLON CANCER SCREENING: ICD-10-CM

## 2025-04-24 DIAGNOSIS — R10.13 EPIGASTRIC PAIN: Primary | ICD-10-CM

## 2025-04-24 DIAGNOSIS — K57.90 DIVERTICULAR DISEASE: ICD-10-CM

## 2025-04-24 LAB
CAMPYLOBACTER DNA SPEC NAA+PROBE: NORMAL
ETEC ELTA+ESTB GENES STL QL NAA+PROBE: NORMAL
P SHIGELLOIDES DNA STL QL NAA+PROBE: NORMAL
SALMONELLA DNA SPEC QL NAA+PROBE: NORMAL
SHIGA TOXIN STX GENE SPEC NAA+PROBE: NORMAL
SHIGELLA DNA SPEC QL NAA+PROBE: NORMAL
SPECIMEN DESCRIPTION: NORMAL
V CHOL+PARA RFBL+TRKH+TNAA STL QL NAA+PR: NORMAL
Y ENTERO RECN STL QL NAA+PROBE: NORMAL

## 2025-04-24 PROCEDURE — 99204 OFFICE O/P NEW MOD 45 MIN: CPT | Performed by: INTERNAL MEDICINE

## 2025-04-24 PROCEDURE — 1159F MED LIST DOCD IN RCRD: CPT | Performed by: INTERNAL MEDICINE

## 2025-04-24 PROCEDURE — 1123F ACP DISCUSS/DSCN MKR DOCD: CPT | Performed by: INTERNAL MEDICINE

## 2025-04-24 PROCEDURE — 1126F AMNT PAIN NOTED NONE PRSNT: CPT | Performed by: INTERNAL MEDICINE

## 2025-04-24 ASSESSMENT — ENCOUNTER SYMPTOMS
ANAL BLEEDING: 0
WHEEZING: 0
COLOR CHANGE: 0
VOICE CHANGE: 0
ABDOMINAL DISTENTION: 1
CONSTIPATION: 0
DIARRHEA: 1
RECTAL PAIN: 0
NAUSEA: 1
SORE THROAT: 0
COUGH: 0
CHOKING: 0
VOMITING: 1
ABDOMINAL PAIN: 1
BLOOD IN STOOL: 0
TROUBLE SWALLOWING: 0

## 2025-04-24 NOTE — PROGRESS NOTES
visit:    Epigastric pain  -     EGD; Future    Diverticular disease  -     COLONOSCOPY (Screening); Future    Colon cancer screening  -     COLONOSCOPY (Screening); Future             RTC:As needed    Additional comments:          Thank you for allowing me to participate in the care of Mr. Lewis. For any further questions please do not hesitate to contact me.      I have reviewed and agree with the MA/LPN ROS please refer to their documentation from today's encounter on a separate note.     Jay Diop MD  Gastroenterology & Hepatology  Office #: [phone]          this note is created with the assistance of a speech recognition program.  While intending to generate a document that actually reflects the content of the visit, the document can still have some errors including those of syntax and sound a like substitutions which may escape proof reading.  It such instances, actual meaning can be extrapolated by contextual diversion.

## 2025-04-24 NOTE — H&P (VIEW-ONLY)
Reason for Referral:       Devon Bhakta MD  [id number]  [place] [address]    Chief Complaint   Patient presents with    New Patient    Follow-Up from Hospital     EGD/Colon, Diverticulitis           HISTORY OF PRESENT ILLNESS: Mr.Michael JOSE Lewis is a 69 y.o. male with a past history remarkable for hypertension, referred for evaluation of diverticulitis.  The patient reports having severe epigastric pain for the last 1 month.  He has had history of gastric ulcers and previously was on Prilosec once a day.  Most recently he has increased it to twice a day.  He denies any symptoms today.  He went to the emergency room and had a CT abdomen and pelvis that showed diverticulitis in the sigmoid area.  No bleeding was reported.  His hemoglobin was normal.  Denies use of any NSAIDs or over-the-counter supplements.      Previous Endoscopies    EGD and colonoscopy 7 years ago, no formal report available    Previous GI workup   CT of 4/22/2025:  IMPRESSION:  1. No acute aortic pathology.  Calcified atherosclerotic plaque with no  aneurysm or dissection.  No significant brachiocephalic or visceral stenosis.  2. No acute pulmonary findings.  3. Findings concerning for uncomplicated diverticulitis involving the sigmoid  colon.  Mild pericolonic inflammatory changes.  See series 4, image 233.  4. No other acute findings within the abdomen or pelvis.    Past Medical,Family, and Social History reviewed and does not contribute to the patient presentingcondition.    Patient's PMH/PSH,SH,PSYCH Hx, MEDs, ALLERGIES, and ROS were all reviewed and updated in the appropriate sections.    PAST MEDICAL HISTORY:  Past Medical History:   Diagnosis Date    Hypertension     Irritable bowel syndrome     Prostate finding        Past Surgical History:   Procedure Laterality Date    BACK SURGERY      COLONOSCOPY  10/06/2017    per Dr. Alvarez    [place] COLON CA SCRN NOT HI RSK IND N/A 10/6/2017    COLONOSCOPY performed by Kris SNOW

## 2025-04-24 NOTE — TELEPHONE ENCOUNTER
Pt saw Dr. Diop today in clinic. EGD/Colonoscopy ordered. Pt states he does not take blood thinners or GLP-1 medications, no cardiac hx.    Procedure scheduled/Dr Diop  Procedure: EGD/Colonoscopy (Screening)  Dx: Epigastric pain; Diverticular disease; Colon cancer screening  Date: Tuesday 04/29/25  Time: 2:30 pm/Arrive at 12:30 pm  Hospital: [facility]; Surgery Entrance, back of hospital  PAT Phone Call:  Bowel Prep instructions given: EGD Prep/SUPREP Split-Bowel Prep  In office/via phone: in office  Clearance needed: N/A  GLP-1: N/A    Pt informed it is required they must have a /responsible adult that takes them to their procedure, stays at the hospital (before, during, and after procedure), and drives pt home. Pt informed /responsible adult must stay inside the hospital during their procedure. Pt voiced understanding of  protocol for procedure.     Pt informed they will receive a PAT Phone Call from a [facility] PAT Nurse 1-2 weeks prior to procedure. Pt informed they must complete PAT Call or procedure may be cancelled.

## 2025-04-25 ENCOUNTER — HOSPITAL ENCOUNTER (OUTPATIENT)
Dept: PREADMISSION TESTING | Age: 70
Discharge: HOME | End: 2025-04-29
Attending: INTERNAL MEDICINE

## 2025-04-25 ENCOUNTER — PREP FOR PROCEDURE (OUTPATIENT)
Dept: GASTROENTEROLOGY | Age: 70
End: 2025-04-25

## 2025-04-25 VITALS — WEIGHT: 202 LBS | HEIGHT: 70 IN | BODY MASS INDEX: 28.92 KG/M2

## 2025-04-25 DIAGNOSIS — K57.90 DIVERTICULAR DISEASE: ICD-10-CM

## 2025-04-25 DIAGNOSIS — R10.13 EPIGASTRIC PAIN: ICD-10-CM

## 2025-04-25 DIAGNOSIS — Z12.11 COLON CANCER SCREENING: ICD-10-CM

## 2025-04-25 RX ORDER — LOSARTAN POTASSIUM 100 MG/1
100 TABLET ORAL DAILY
COMMUNITY

## 2025-04-25 RX ORDER — SODIUM, POTASSIUM,MAG SULFATES 17.5-3.13G
SOLUTION, RECONSTITUTED, ORAL ORAL
Qty: 1 EACH | Refills: 0 | Status: SHIPPED | OUTPATIENT
Start: 2025-04-25

## 2025-04-25 NOTE — TELEPHONE ENCOUNTER
Patient left message stating that his pharmacy does not have his prep.  Please resend to [facility] on [address].

## 2025-04-25 NOTE — PROGRESS NOTES
to the pre-op holding area where you will be prepared for surgery.  A physical assessment will be performed by a nurse practitioner or house officer.  Your IV will be started and you will meet your anesthesiologist.    When you go to surgery, your family will be directed to the surgical waiting room, where the doctor should speak with them after your surgery.    After surgery, you will be taken to the recovery area.  When you are alert and stable, you will receive instructions and be prepared for discharge.   Reviewed with patient over phone. Verbalized understanding. Message sent to Dr. Diop office as patient states [facility] has not received order for Prep.

## 2025-04-25 NOTE — TELEPHONE ENCOUNTER
Pt's prescription was sent on: E-Prescribing Status: Receipt confirmed by pharmacy (4/25/2025 10:13 AM EDT     Writer called [facility] on [address] and was told prescription was received this morning and they are working on it now. Pharmacy said it took time to get it ready.    Writer called pt at home number. Pt's wife Isatu answered and said Garcia just stepped out for a bit. Writer informed Isatu that pt's script was sent last night and received this morning by the pharmacy. Pharmacy said they did have it this morning it just took time to get it ready but they are filling it now.

## 2025-04-28 ENCOUNTER — ANESTHESIA EVENT (OUTPATIENT)
Dept: ENDOSCOPY | Age: 70
End: 2025-04-28
Payer: MEDICARE

## 2025-04-29 ENCOUNTER — ANESTHESIA (OUTPATIENT)
Dept: ENDOSCOPY | Age: 70
End: 2025-04-29
Payer: MEDICARE

## 2025-04-29 ENCOUNTER — HOSPITAL ENCOUNTER (OUTPATIENT)
Age: 70
Setting detail: OUTPATIENT SURGERY
Discharge: HOME | End: 2025-04-29
Attending: INTERNAL MEDICINE | Admitting: INTERNAL MEDICINE
Payer: MEDICARE

## 2025-04-29 VITALS
OXYGEN SATURATION: 99 % | HEART RATE: 54 BPM | BODY MASS INDEX: 28.92 KG/M2 | HEIGHT: 70 IN | SYSTOLIC BLOOD PRESSURE: 120 MMHG | WEIGHT: 202 LBS | RESPIRATION RATE: 20 BRPM | TEMPERATURE: 97.2 F | DIASTOLIC BLOOD PRESSURE: 75 MMHG

## 2025-04-29 DIAGNOSIS — K57.90 DIVERTICULAR DISEASE: ICD-10-CM

## 2025-04-29 DIAGNOSIS — R10.13 EPIGASTRIC PAIN: ICD-10-CM

## 2025-04-29 DIAGNOSIS — Z12.11 COLON CANCER SCREENING: ICD-10-CM

## 2025-04-29 PROBLEM — R19.4 ALTERED BOWEL HABITS: Status: ACTIVE | Noted: 2025-04-29

## 2025-04-29 PROCEDURE — 2709999900 HC NON-CHARGEABLE SUPPLY: Performed by: INTERNAL MEDICINE

## 2025-04-29 PROCEDURE — 3609010600 HC COLONOSCOPY POLYPECTOMY SNARE/COLD BIOPSY: Performed by: INTERNAL MEDICINE

## 2025-04-29 PROCEDURE — 88305 TISSUE EXAM BY PATHOLOGIST: CPT

## 2025-04-29 PROCEDURE — 7100000011 HC PHASE II RECOVERY - ADDTL 15 MIN: Performed by: INTERNAL MEDICINE

## 2025-04-29 PROCEDURE — 6360000002 HC RX W HCPCS

## 2025-04-29 PROCEDURE — C1769 GUIDE WIRE: HCPCS | Performed by: INTERNAL MEDICINE

## 2025-04-29 PROCEDURE — 3700000000 HC ANESTHESIA ATTENDED CARE: Performed by: INTERNAL MEDICINE

## 2025-04-29 PROCEDURE — 2580000003 HC RX 258: Performed by: ANESTHESIOLOGY

## 2025-04-29 PROCEDURE — 6360000002 HC RX W HCPCS: Performed by: ANESTHESIOLOGY

## 2025-04-29 PROCEDURE — 3609013500 HC EGD REMOVAL TUMOR POLYP/OTHER LESION SNARE TECH: Performed by: INTERNAL MEDICINE

## 2025-04-29 PROCEDURE — 7100000010 HC PHASE II RECOVERY - FIRST 15 MIN: Performed by: INTERNAL MEDICINE

## 2025-04-29 PROCEDURE — 3700000001 HC ADD 15 MINUTES (ANESTHESIA): Performed by: INTERNAL MEDICINE

## 2025-04-29 RX ORDER — SODIUM CHLORIDE 0.9 % (FLUSH) 0.9 %
5-40 SYRINGE (ML) INJECTION PRN
Status: DISCONTINUED | OUTPATIENT
Start: 2025-04-29 | End: 2025-04-29 | Stop reason: HOSPADM

## 2025-04-29 RX ORDER — SODIUM CHLORIDE 0.9 % (FLUSH) 0.9 %
5-40 SYRINGE (ML) INJECTION EVERY 12 HOURS SCHEDULED
Status: DISCONTINUED | OUTPATIENT
Start: 2025-04-29 | End: 2025-04-29 | Stop reason: HOSPADM

## 2025-04-29 RX ORDER — SODIUM CHLORIDE, SODIUM LACTATE, POTASSIUM CHLORIDE, CALCIUM CHLORIDE 600; 310; 30; 20 MG/100ML; MG/100ML; MG/100ML; MG/100ML
INJECTION, SOLUTION INTRAVENOUS CONTINUOUS
Status: DISCONTINUED | OUTPATIENT
Start: 2025-04-29 | End: 2025-04-29 | Stop reason: HOSPADM

## 2025-04-29 RX ORDER — FENTANYL CITRATE 50 UG/ML
INJECTION, SOLUTION INTRAMUSCULAR; INTRAVENOUS
Status: DISCONTINUED | OUTPATIENT
Start: 2025-04-29 | End: 2025-04-29 | Stop reason: SDUPTHER

## 2025-04-29 RX ORDER — PROPOFOL 10 MG/ML
INJECTION, EMULSION INTRAVENOUS
Status: DISCONTINUED | OUTPATIENT
Start: 2025-04-29 | End: 2025-04-29 | Stop reason: SDUPTHER

## 2025-04-29 RX ORDER — LIDOCAINE HYDROCHLORIDE 20 MG/ML
INJECTION, SOLUTION EPIDURAL; INFILTRATION; INTRACAUDAL; PERINEURAL
Status: DISCONTINUED | OUTPATIENT
Start: 2025-04-29 | End: 2025-04-29 | Stop reason: SDUPTHER

## 2025-04-29 RX ORDER — LIDOCAINE HYDROCHLORIDE 10 MG/ML
1 INJECTION, SOLUTION EPIDURAL; INFILTRATION; INTRACAUDAL; PERINEURAL
Status: COMPLETED | OUTPATIENT
Start: 2025-04-29 | End: 2025-04-29

## 2025-04-29 RX ORDER — SODIUM CHLORIDE 9 MG/ML
INJECTION, SOLUTION INTRAVENOUS PRN
Status: DISCONTINUED | OUTPATIENT
Start: 2025-04-29 | End: 2025-04-29 | Stop reason: HOSPADM

## 2025-04-29 RX ADMIN — FENTANYL CITRATE 25 MCG: 50 INJECTION INTRAMUSCULAR; INTRAVENOUS at 13:33

## 2025-04-29 RX ADMIN — PROPOFOL 50 MG: 10 INJECTION, EMULSION INTRAVENOUS at 13:33

## 2025-04-29 RX ADMIN — LIDOCAINE HYDROCHLORIDE 1 ML: 10 INJECTION, SOLUTION EPIDURAL; INFILTRATION; INTRACAUDAL; PERINEURAL at 13:09

## 2025-04-29 RX ADMIN — PROPOFOL 120 MCG/KG/MIN: 10 INJECTION, EMULSION INTRAVENOUS at 13:34

## 2025-04-29 RX ADMIN — SODIUM CHLORIDE, POTASSIUM CHLORIDE, SODIUM LACTATE AND CALCIUM CHLORIDE: 600; 310; 30; 20 INJECTION, SOLUTION INTRAVENOUS at 13:09

## 2025-04-29 RX ADMIN — LIDOCAINE HYDROCHLORIDE 80 MG: 20 INJECTION, SOLUTION EPIDURAL; INFILTRATION; INTRACAUDAL; PERINEURAL at 13:33

## 2025-04-29 ASSESSMENT — ENCOUNTER SYMPTOMS
RESPIRATORY NEGATIVE: 1
DIARRHEA: 1

## 2025-04-29 ASSESSMENT — PAIN - FUNCTIONAL ASSESSMENT
PAIN_FUNCTIONAL_ASSESSMENT: NONE - DENIES PAIN
PAIN_FUNCTIONAL_ASSESSMENT: 0-10

## 2025-04-29 NOTE — OP NOTE
Colonoscopy report    Colonoscopy Procedure Note    Procedure:  Colonoscopy with polypectomy with snare, biopsies    Procedure Date: 4/29/2025    Indications: Colon cancer screening, diverticular disease, altered bowel habits    Sedation:  MAC    Attending Physician:  Dr. Jay Diop MD.     Assistant Physician: None    Consent:   Informed consent was obtained for the procedure after explaining the risks including bleeding, perforation, aspiration, arrhythmia, risks related to sedation, reaction to medications and rarely death, benefits and alternatives to the patient. The patient verbalized understanding and agreed to proceed with the procedure.       Procedure Details:  The patient was placed in the left lateral decubitus position.  Oxygen and cardiac monitoring equipment was attached. The patient's vital signs were monitored continuously  throughout the procedure. After appropriate sedation was achieved, a rectal examination was performed.  The pediatric colonoscopy was inserted into the rectum and advanced under direct vision to the terminal ileum.  The quality of the colonic preparation was good.  A careful inspection was made as the colonoscope was withdrawn, including a retroflexed view of the rectum; findings and interventions are described below.  Appropriate photodocumentation was obtained.           Complications:  None    Estimated blood loss:  Minimal           Disposition:  Home           Condition: stable    Withdrawal Time: 14 minutes    Findings:   The bowel prep was good.  The terminal ileum was noted to have few scattered erosions, biopsies obtained.  The sigmoid colon was noted to have some patchy erythema particularly surrounding the diverticula, biopsies obtained.  The remainder of the colon was unremarkable, random colonic biopsies obtained.  There were multiple large and small mouth diverticula throughout the colon.  A 4 mm polyp noted in the ascending colon resected with cold

## 2025-04-29 NOTE — OP NOTE
EGD report    Esophagogastroduodenoscopy (EGD) Procedure Note    Procedure:  EGD with biopsies, polypectomy with snare, dilation with savory guidewire assisted    Procedure Date: 4/29/2025    Indications: Epigastric pain, dysphagia    Sedation:  MAC    Attending Physician:  Dr. Jay Diop MD    Assistant:  None    Procedure Details:    Informed consent was obtained for the procedure, including sedation. Risks of infection, perforation, hemorrhage, adverse drug reaction, and aspiration were discussed. The patient was placed in the left lateral decubitus position. The patient was monitored continuously with ECG tracing, pulse oximetry, blood pressure monitoring, and direct observation.      The gastroscope was inserted into the mouth and advanced under direct vision to second portion of the duodenum.  A careful inspection was made as the gastroscope was withdrawn, including a retroflexed view of the proximal stomach; findings and interventions are described below. Appropriate photodocumentation was obtained.    Findings:  Retropharyngeal area was grossly normal appearing     Esophagus: The esophagus appeared narrow and somewhat tortuous, using a guidewire assisted savory, dilation up to 17 mm (51 Japanese) performed.  No mucosal disruption noted.  Few fluffy white lesions noted in the distal esophagus, biopsies obtained.                          Esophagogastric markings: Diaphragmatic hiatus- 41 cm; GE junction- 41 cm     Stomach:    Fundus: An 8 to 10 mm sessile polyp noted that was resected with cold snare    Body: Few erosions noted, biopsies obtained to rule out H. pylori    Antrum: normal     Duodenum:     Bulb: normal    First part: Normal    Second Part: Normal  Biopsies obtained to rule out celiac disease    Complications:  None           Estimated blood loss:  Minimal    Disposition:  [facility] [place]           Condition: Stable    Specimen Removed: Stomach, stomach polyps, duodenum, distal

## 2025-04-29 NOTE — DISCHARGE INSTRUCTIONS
during the colon prep.  Do not drink alcohol.  Medicines  If polyps were removed or a biopsy was done during the test, your doctor may tell you not to take aspirin or other anti-inflammatory medicines, such as ibuprofen (Advil, Motrin) and naproxen (Aleve), for a few days.  Other instructions  For your safety, you should not drive or operate machinery until the medicine effects are gone and you can think clearly. Your doctor may tell you not to drive or operate machinery until the day after your test.  Do not sign legal documents or make major decisions until the medicine effects are gone and you can think clearly. The anesthesia medicine can make it hard for you to fully understand what you are agreeing to.  Follow-up care is a key part of your treatment and safety. Be sure to make and go to all appointments, and call your doctor if you are having problems. It's also a good idea to know your test results and keep a list of the medicines you take.    Call your Doctor if you have any of the following:             Passing blood rectally or vomiting blood (it may be red or black).      Persistent nausea or vomiting.      Severe abdominal or chest pain, not relieved by passing gas.      Fever of 100 or more, chills or excessive sweating.      Redness or swelling at the IV site.     If you experience shortness of breath or severe chest pain, call 911.           Where can you learn more?   Go to [email or web address] and sign in to your [email or web address] account. Enter E264 in the Search Health Information box to learn more about “Colonoscopy: What to Expect at Home.”    If you do not have an account, please click on the “Sign Up Now” link.     © 6642-6570 Healthwise, Incorporated. Care instructions adapted under license by [employer]. This care instruction is for use with your licensed healthcare professional. If you have questions about a medical condition or this instruction, always ask your

## 2025-04-29 NOTE — INTERVAL H&P NOTE
Update History & Physical    The patient's History and Physical of April 24, 2025 was reviewed with the patient and I examined the patient. There was no change. The surgical site was confirmed by the patient and me.     Plan: The risks, benefits, expected outcome, and alternative to the recommended procedure have been discussed with the patient. Patient understands and wants to proceed with the procedure.     Electronically signed by Jay Diop MD on 4/29/2025 at 12:43 PM

## 2025-04-29 NOTE — H&P
HISTORY and PHYSICAL  [facility]       NAME:  Garcia Lewis  MRN: [id number]   YOB: 1955   Date: 4/29/2025   Age: 69 y.o.  Gender: male       COMPLAINT AND PRESENT HISTORY:     Garcia Lewis is 69 y.o.,  male, presents for ESOPHAGOGASTRODUODENOSCOPY BIOPSY, COLORECTAL CANCER SCREENING, NOT HIGH RISK     Primary dx: Epigastric pain [R10.13]  Diverticular disease [K57.90]  Colon cancer screening [Z12.11].    HPI:  Garcia Lewis is 69 y.o.,  male, will be having a Colonoscopy and EGD.  Prior Colonoscopy done 2017 and EGD was done 1983  Patient has hx of Diverticulosis.   Patient is s/p Colon Surgery.   Patient denies any FH of Colon or esophogeal  Cancer.    Patient reports changes in bowel habits. Pt has diarrhea started on 4/20/25 ,pt had 5-6 BM per day No GI /Rectal bleeding, experiencing red/ black/ BRBPR stools.    Patient has no  history of abd. pain , no N/V, no abdominal bloating,   Patient denies any Dysphagia.  Pt  has had history of gastric ulcers and he complain of heartburn  Currently he is on Prilosec with improvement.  No fever or chills, chest pain or SOB   Prep fully completed: yes . Pt reports his last BM is clear liquid     Review of additional significant medical hx:  (See chart for additional detail, including current medications /see ROS for current S/S):     NPO status: pt has small coup of tea since 9 am today   Medications taken TODAY (with sip of water): none  Anticoagulation status: none    Denies personal hx of blood clots.  Denies personal hx of MRSA infection.  Denies any personal or family hx of previous complications w/anesthesia.    PAST MEDICAL HISTORY     Past Medical History:   Diagnosis Date    Hypertension     Irritable bowel syndrome     Prostate finding        SURGICAL HISTORY       Past Surgical History:   Procedure Laterality Date    BACK SURGERY      COLONOSCOPY  10/06/2017    per Dr. Alvarez    [place] COLON CA SCRN NOT HI RSK IND N/A

## 2025-04-29 NOTE — PROGRESS NOTES
Anesthesia (Dr. Loya) notified that patient drank about a half glass of tea with nothing added to it, around 0900 this morning. Ok to proceed.

## 2025-04-29 NOTE — ANESTHESIA POSTPROCEDURE EVALUATION
Department of Anesthesiology  Postprocedure Note    Patient: Garcia Lewis  MRN: [id number]  YOB: 1955  Date of evaluation: 4/29/2025    Procedure Summary       Date: 04/29/25 Room / Location: [place] [address] / [facility]    Anesthesia Start: 1329 Anesthesia Stop: 1417    Procedures:       ESOPHAGOGASTRODUODENOSCOPY POLYP BIOPSY/SNARE WITH DILATION (Esophagus)      COLONOSCOPY POLYPECTOMY SNARE/BIOPSY (Rectum) Diagnosis:       Epigastric pain      Diverticular disease      Colon cancer screening      (Epigastric pain [R10.13])      (Diverticular disease [K57.90])      (Colon cancer screening [Z12.11])    Surgeons: Jay Diop MD Responsible Provider: Danya Loya MD    Anesthesia Type: General ASA Status: 2            Anesthesia Type: General    Mitzy Phase I: Mitzy Score: 10    Mitzy Phase II: Mitzy Score: 9    Anesthesia Post Evaluation    Comments: POST- ANESTHESIA EVALUATION       Pt Name: Garcia Lewis  MRN: [id number]  YOB: 1955  Date of evaluation: 4/29/2025  Time:  3:37 PM      /75   Pulse 54   Temp 97.2 °F (36.2 °C) (Infrared)   Resp 20   Ht 1.778 m (5' 10\")   Wt 91.6 kg (202 lb)   SpO2 99%   BMI 28.98 kg/m²      Consciousness Level  Awake  Cardiopulmonary Status  Stable  Pain Adequately Treated YES  Nausea / Vomiting  NO  Adequate Hydration  YES  Anesthesia Related Complications NONE      Electronically signed by Danya Loya MD on 4/29/2025 at 3:37 PM      No notable events documented.

## 2025-05-01 ENCOUNTER — RESULTS FOLLOW-UP (OUTPATIENT)
Dept: GASTROENTEROLOGY | Age: 70
End: 2025-05-01

## 2025-05-01 LAB — SURGICAL PATHOLOGY REPORT: NORMAL

## 2025-05-01 RX ORDER — FLUCONAZOLE 200 MG/1
TABLET ORAL
Qty: 16 TABLET | Refills: 0 | Status: SHIPPED | OUTPATIENT
Start: 2025-05-01

## 2025-05-01 NOTE — RESULT ENCOUNTER NOTE
Please let patient know that esophagus biopsies demonstrated Candida infection.  Will recommend fluconazole:    Take 400 mg (2 tablets) on day 1 and then 200 mg (1 tablet) for the next 14 days    Also colon polyp came back as precancerous, repeat colonoscopy in 7 years is recommended

## 2025-05-02 NOTE — TELEPHONE ENCOUNTER
----- Message from Dr. Jay Diop MD sent at 5/1/2025  4:29 PM EDT -----  Please let patient know that esophagus biopsies demonstrated Candida infection.  Will recommend fluconazole:     Take 400 mg (2 tablets) on day 1 and then 200 mg (1 tablet) for the next 14 days     Also colon polyp came back as precancerous, repeat colonoscopy in 7 years is recommended    Patient also wanted to know if he was able to continue taking Carafate while on Fluconazole. Writer noted yes can continue to take Carafate. Patient thanked writer. Please advise.

## 2025-05-07 ENCOUNTER — APPOINTMENT (OUTPATIENT)
Dept: PHYSICAL THERAPY | Facility: CLINIC | Age: 70
End: 2025-05-07
Payer: MEDICARE

## 2025-05-14 ENCOUNTER — HOSPITAL ENCOUNTER (OUTPATIENT)
Dept: PHYSICAL THERAPY | Facility: CLINIC | Age: 70
Setting detail: THERAPIES SERIES
Discharge: HOME | End: 2025-05-14
Payer: MEDICARE

## 2025-05-14 PROCEDURE — 97110 THERAPEUTIC EXERCISES: CPT

## 2025-05-14 PROCEDURE — 97032 APPL MODALITY 1+ESTIM EA 15: CPT

## 2025-05-14 PROCEDURE — 90912 BFB TRAINING 1ST 15 MIN: CPT

## 2025-05-14 NOTE — FLOWSHEET NOTE
[] [facility]  Outpatient Rehabilitation &  Therapy  [address]  P: [phone]  F: [phone] [x] [facility]  Outpatient Rehabilitation &  Therapy  [address]  P: [phone]  F: [phone]     Physical Therapy Daily Treatment Note    Date:  2025  Patient Name:  Garcia Lewis    :  1955  MRN: [id number]  Physician: Uri Garcia                                Insurance: [employer] Medicare [place]  Medical Diagnosis: Benign prostatic hyperplasia with lower urinary tract symptoms N40.1                Rehab Codes: R35.0, R35.15, M62.50, N39.3  Onset Date: 25                                  Next 's appt: in 2 weeks    Visit# / total visits: ; Progress note for Medicare patient due at visit 8     Cancels/No Shows:     Subjective:    Pain:  [] Yes  [x] No Location: N/A Pain Rating: (0-10 scale) 0/10  Pain altered Tx:  [x] No  [] Yes  Action:  Comments:Pt states he may be a little better with less frequency.  Pt with no leaks.  Pt tries to use urge defer tech at home, but cont to go frequently while out, for fear he won't make it to the bathroom.  Pt does not wear a pad and states he will not start doing this in case he has a leak.  Pt was hospitalized after [facility] for infection in esophagus, and severe diarrhea. Colonoscopy with benign polyp found.  Pt notes has always had irritable bowel, but had a severe flare up this time.  Objective:  Modalities:   Precautions [x] No  [] Yes:   Exercises:  Exercise Reps/ Time Weight/ Level Comments   Male PF pic explanation [x]     Review   Urine stop test  [x]     Review   PF ex: short holds  30-40x  1-2 sec     PF ex: long holds  30-40x  10 x 5 sec     The knack ex  [x]     Review    Urge deference tech  [x]       Diaphragmatic Breathing ex 10x     Voiding tips X     TA ex 5\"x10  HO given   bridges 10     3 way clamshells 10 lime    Functional ex with PB X     Other:

## 2025-05-25 PROBLEM — Z12.11 COLON CANCER SCREENING: Status: RESOLVED | Noted: 2025-04-25 | Resolved: 2025-05-25

## 2025-06-09 ENCOUNTER — CLINICAL DOCUMENTATION (OUTPATIENT)
Dept: PHYSICAL THERAPY | Facility: CLINIC | Age: 70
End: 2025-06-09

## 2025-06-09 NOTE — THERAPY DISCHARGE
[x] [facility]  Outpatient Rehabilitation &  Therapy  [address]  P: [phone]  F: [phone] [] [facility]  Outpatient Rehabilitation &  Therapy  [address]  [phone]  [phone]     Physical Therapy Discharge Note    Date: 2025      Patient: Garcia Lewis  : 1955  MRN: [id number]    Physician: Uri Garcia                                Insurance: [facility] [employer] [employer] Medicare ([place])  Medical Diagnosis: Benign prostatic hyperplasia with lower urinary tract symptoms N40.1                Rehab Codes: R35.0, R35.15, M62.50, N39.3  Onset Date: 25                                  Next 's appt: in 2 weeks  Total visits attended:  Cancels/No shows: 0  Date of initial visit: 25                Date of final visit: 25       Discharge Status:     Pt failed to make additional appointments for therapy.  Pt. is now discharged.        Electronically signed by: Naomi Galeano PT    If you have any questions or concerns, please don't hesitate to call.  Thank you for your referral.

## (undated) DEVICE — SPONGE GZ W4XL4IN COT 4 PLY WVN

## (undated) DEVICE — MEDI-VAC NON-CONDUCTIVE SUCTION TUBING 7MM X 6.1M (20 FT.) L: Brand: CARDINAL HEALTH

## (undated) DEVICE — POLYP TRAP: Brand: TRAPEASE®

## (undated) DEVICE — 60 ML SYRINGE LUER-LOCK TIP: Brand: MONOJECT

## (undated) DEVICE — GOWN,AURORA,NONRNF,XL,30/CS: Brand: MEDLINE

## (undated) DEVICE — DEFENDO AIR WATER SUCTION AND BIOPSY VALVE KIT FOR  OLYMPUS: Brand: DEFENDO AIR/WATER/SUCTION AND BIOPSY VALVE

## (undated) DEVICE — SAVARY GILLIARD WIRE GUIDE: Brand: SAVARY GILLIARD

## (undated) DEVICE — SNARE ENDOSCP AD L240CM LOOP W10MM SHTH DIA2.4MM RND INSUL

## (undated) DEVICE — BITEBLOCK 54FR W/ DENT RIM BLOX

## (undated) DEVICE — ENDO KIT W/SYRINGE: Brand: MEDLINE INDUSTRIES, INC.

## (undated) DEVICE — GOWN,AURORA,NONREINFORCED,LARGE: Brand: MEDLINE

## (undated) DEVICE — ADAPTER LAB  15GA INTMED LUER STUB

## (undated) DEVICE — BRUSH PRESOAK CLEAN BACTERIOSTATIC DUAL

## (undated) DEVICE — SOLUTION IV IRRIG WATER 1000ML POUR BRL 2F7114

## (undated) DEVICE — STERILE LATEX POWDER-FREE SURGICAL GLOVESWITH NITRILE COATING: Brand: PROTEXIS

## (undated) DEVICE — EMESIS BASIN: Brand: DEROYAL

## (undated) DEVICE — 4-PORT MANIFOLD: Brand: NEPTUNE 2

## (undated) DEVICE — FORCEPS BX L240CM WRK CHN 2.8MM STD CAP W/ NDL MIC MESH

## (undated) DEVICE — TUBING IRRIGATIONINSTRUMENT CHN ST DISPOSABLE